# Patient Record
Sex: FEMALE | Race: ASIAN | NOT HISPANIC OR LATINO | Employment: UNEMPLOYED
[De-identification: names, ages, dates, MRNs, and addresses within clinical notes are randomized per-mention and may not be internally consistent; named-entity substitution may affect disease eponyms.]

---

## 2019-08-27 ENCOUNTER — RECORDS - HEALTHEAST (OUTPATIENT)
Dept: ADMINISTRATIVE | Facility: OTHER | Age: 24
End: 2019-08-27

## 2020-02-27 ENCOUNTER — RECORDS - HEALTHEAST (OUTPATIENT)
Dept: ADMINISTRATIVE | Facility: OTHER | Age: 25
End: 2020-02-27

## 2020-02-27 ENCOUNTER — AMBULATORY - HEALTHEAST (OUTPATIENT)
Dept: SURGERY | Facility: CLINIC | Age: 25
End: 2020-02-27

## 2020-02-27 DIAGNOSIS — R22.9 MASS OF SKIN: ICD-10-CM

## 2020-03-13 ENCOUNTER — OFFICE VISIT - HEALTHEAST (OUTPATIENT)
Dept: SURGERY | Facility: CLINIC | Age: 25
End: 2020-03-13

## 2020-03-13 DIAGNOSIS — R22.2 MASS OF TORSO: ICD-10-CM

## 2020-03-16 ENCOUNTER — COMMUNICATION - HEALTHEAST (OUTPATIENT)
Dept: OTOLARYNGOLOGY | Facility: CLINIC | Age: 25
End: 2020-03-16

## 2020-06-18 ENCOUNTER — COMMUNICATION - HEALTHEAST (OUTPATIENT)
Dept: SURGERY | Facility: CLINIC | Age: 25
End: 2020-06-18

## 2020-06-22 ENCOUNTER — COMMUNICATION - HEALTHEAST (OUTPATIENT)
Dept: SURGERY | Facility: CLINIC | Age: 25
End: 2020-06-22

## 2020-06-22 ENCOUNTER — AMBULATORY - HEALTHEAST (OUTPATIENT)
Dept: SURGERY | Facility: CLINIC | Age: 25
End: 2020-06-22

## 2020-06-22 DIAGNOSIS — Z11.59 ENCOUNTER FOR SCREENING FOR OTHER VIRAL DISEASES: ICD-10-CM

## 2020-06-23 ENCOUNTER — COMMUNICATION - HEALTHEAST (OUTPATIENT)
Dept: SURGERY | Facility: CLINIC | Age: 25
End: 2020-06-23

## 2020-06-24 ENCOUNTER — AMBULATORY - HEALTHEAST (OUTPATIENT)
Dept: FAMILY MEDICINE | Facility: CLINIC | Age: 25
End: 2020-06-24

## 2020-06-24 ENCOUNTER — RECORDS - HEALTHEAST (OUTPATIENT)
Dept: ADMINISTRATIVE | Facility: OTHER | Age: 25
End: 2020-06-24

## 2020-06-24 DIAGNOSIS — Z11.59 ENCOUNTER FOR SCREENING FOR OTHER VIRAL DISEASES: ICD-10-CM

## 2020-06-24 ASSESSMENT — MIFFLIN-ST. JEOR: SCORE: 1056.6

## 2020-06-25 ENCOUNTER — ANESTHESIA - HEALTHEAST (OUTPATIENT)
Dept: SURGERY | Facility: AMBULATORY SURGERY CENTER | Age: 25
End: 2020-06-25

## 2020-06-26 ENCOUNTER — SURGERY - HEALTHEAST (OUTPATIENT)
Dept: SURGERY | Facility: AMBULATORY SURGERY CENTER | Age: 25
End: 2020-06-26

## 2020-06-26 ASSESSMENT — MIFFLIN-ST. JEOR: SCORE: 1056.6

## 2021-06-04 VITALS
OXYGEN SATURATION: 100 % | WEIGHT: 96.9 LBS | DIASTOLIC BLOOD PRESSURE: 72 MMHG | HEART RATE: 68 BPM | SYSTOLIC BLOOD PRESSURE: 102 MMHG

## 2021-06-04 VITALS — BODY MASS INDEX: 22.5 KG/M2 | HEIGHT: 56 IN | WEIGHT: 100 LBS

## 2021-06-06 NOTE — TELEPHONE ENCOUNTER
Attempted to contact pt to advise that surgery has been canceled at this time but the phone would ring once then busy tone. Tried twice.  Surgery will be r/s once the hold has been lifted.

## 2021-06-09 NOTE — ANESTHESIA PREPROCEDURE EVALUATION
Anesthesia Evaluation      Patient summary reviewed   No history of anesthetic complications     Airway   Mallampati: I  Neck ROM: full   Pulmonary - negative ROS and normal exam                          Cardiovascular - negative ROS and normal exam   Neuro/Psych - negative ROS     Endo/Other - negative ROS      GI/Hepatic/Renal - negative ROS           Dental - normal exam                        Anesthesia Plan  Planned anesthetic: MAC  Versed/fentanyl/propofol  Ketamine PRN  Decadron/zofran    ASA 1   Induction: intravenous   Anesthetic plan and risks discussed with: patient  Anesthesia plan special considerations: antiemetics,   Post-op plan: routine recovery      covid pcr negative 6/24/2020      Results for orders placed or performed during the hospital encounter of 06/26/20   POCT Pregnancy (Beta-hCG, Qual), Urine (Point of Care) on DOS   Result Value Ref Range    POC Preg, Urine Negative Negative    POCT Kit Lot Number [id number]     POCT Kit Expiration Date 2,022-1     Pos Control Valid Control Valid Control    Neg Control Valid Control Valid Control    Dipstick Lot Number      Dipstick Expiration Date      POC Specific Gravity, Urine
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
## 2021-06-09 NOTE — ANESTHESIA POSTPROCEDURE EVALUATION
Patient: Kuldeep Archer Her  Procedure(s):  EXCISION, LESION, TORSO  Anesthesia type: MAC    Patient location: Phase II Recovery  Last vitals:   Vitals Value Taken Time   BP 91/59 6/26/2020 10:52 AM   Temp 36.6  C (97.8  F) 6/26/2020 10:32 AM   Pulse 68 6/26/2020 10:59 AM   Resp 16 6/26/2020 10:32 AM   SpO2 100 % 6/26/2020 10:59 AM   Vitals shown include unvalidated device data.  Post vital signs: stable  Level of consciousness: awake and responds to simple questions  Post-anesthesia pain: pain controlled  Post-anesthesia nausea and vomiting: no  Pulmonary: unassisted, return to baseline  Cardiovascular: stable and blood pressure at baseline  Hydration: adequate  Anesthetic events: no    QCDR Measures:  ASA# 11 - Jennifer-op Cardiac Arrest: ASA11B - Patient did NOT experience unanticipated cardiac arrest  ASA# 12 - Jennifer-op Mortality Rate: ASA12B - Patient did NOT die  ASA# 13 - PACU Re-Intubation Rate: NA - No ETT / LMA used for case  ASA# 10 - Composite Anes Safety: ASA10A - No serious adverse event    Additional Notes:

## 2021-06-09 NOTE — ANESTHESIA CARE TRANSFER NOTE
Last vitals:   Vitals:    06/26/20 1032   BP: 91/55   Pulse: 60   Resp: 16   Temp: 36.6  C (97.8  F)   SpO2: 100%     Patient's level of consciousness is drowsy  Spontaneous respirations: yes  Maintains airway independently: yes  Dentition unchanged: yes  Oropharynx: oropharynx clear of all foreign objects    QCDR Measures:  ASA# 20 - Surgical Safety Checklist: WHO surgical safety checklist completed prior to induction    PQRS# 430 - Adult PONV Prevention: 4558F - Pt received => 2 anti-emetic agents (different classes) preop & intraop  ASA# 8 - Peds PONV Prevention: NA - Not pediatric patient, not GA or 2 or more risk factors NOT present  PQRS# 424 - Jennifer-op Temp Management: 4559F - At least one body temp DOCUMENTED => 35.5C or 95.9F within required timeframe  PQRS# 426 - PACU Transfer Protocol: - Transfer of care checklist used  ASA# 14 - Acute Post-op Pain: ASA14B - Patient did NOT experience pain >= 7 out of 10

## 2021-06-09 NOTE — TELEPHONE ENCOUNTER
Spoke with Enzo. She was hoping for surgery this Friday. Went over the following details with her:    Surgery Date: Friday June 26th     Location: [facility]                 3rd Floor, [facility]                  [address] [place] [id number]     Arrival Time: 7:15AM (unless instructed otherwise by the preop nurse)    Prep:     1. Schedule a preop physical with your primary care doctor. This may be virtual or face-to-face depending on your doctors preference. Call them right away to schedule this.    2. COVID19 testing is required within 72 hours of surgery. A nurse from [facility] will contact you to arrange this. If the test result is positive, your surgery will be canceled.    3. Nothing to eat or drink for 8 hours before surgery unless instructed differently by the preop nurse.    4. No blood thinners including aspirin for one week prior to surgery. Verify this is safe for you with your primary care doctor before stopping.     5. You need an adult to drive you home and stay with you 24 hours after surgery. Because of COVID19 related visitor restrictions, your escort cannot accompany you to the center. A nurse will call when you are ready to be picked up.    6. When you arrive to the hospital, you will be screened for COVID19 symptoms. If you screen positive, your surgery will need to be postponed for your safety.    7. If the community sees a new surge in COVID19 hospital admissions, your procedure may need to be postponed. We will contact you if this happens.    8. We always encourage you to notify your insurance any time you have something scheduled including surgery. The number is usually right on the back of your insurance card.     Latricia Grewal  [facility], General Surgery  Surgery Scheduler  [phone] (Direct Line)  [phone] (Main Line)

## 2021-06-16 PROBLEM — R22.2 MASS OF TORSO: Status: ACTIVE | Noted: 2020-03-13

## 2021-06-20 NOTE — LETTER
Letter by Hardik Pittman MD at      Author: Hardik Pittman MD Service: -- Author Type: --    Filed:  Encounter Date: 3/13/2020 Status: (Other)         Kuldeep Max MD  [address] [place] [id number]                                  March 22, 2020    Patient: Enzo Boyce   MR Number: [id number]   YOB: 1995   Date of Visit: 3/13/2020     Dear Dr. Mansoor MD:    Thank you for referring Enzo Boyce to me for evaluation. Below are the relevant portions of my assessment and plan of care.    If you have questions, please do not hesitate to call me. I look forward to following Enzo along with you.    Sincerely,        Hardik Pittman MD          CC  No Recipients  Hardik Pittman MD  3/22/2020  4:18 PM  Sign when Signing Visit      [facility] Surgery Consult    HPI:    24 y.o. year old female who I have been consulted by Provider, No Primary Care for evaluation of Mass (Mass present on left buttock for several years. )    Has been there form years. Feels like a lipoma but may represent a cyst. Not been infected or ever drained.     Allergies:Patient has no known allergies.    History reviewed. No pertinent past medical history.    History reviewed. No pertinent surgical history.    CURRENT MEDS:  No current outpatient medications on file prior to visit.     No current facility-administered medications on file prior to visit.        History reviewed. No pertinent family history.     reports that she has never smoked. She has never used smokeless tobacco. She reports previous alcohol use.    Review of Systems:  Negative except left buttock mass, enlarging over time and more symptomatic. Otherwise twelve system of review is negative.      OBJECTIVE:  Vitals:    03/13/20 1133   BP: 102/72   Patient Site: Right Arm   Patient Position: Sitting   Cuff Size: Adult Regular   Pulse: 68   SpO2: 100%   Weight: (!) 96 lb 14.4 oz (44 kg)     There is no height or weight on file to calculate  BMI.    EXAM:  GENERAL: This is a well-developed 24 y.o. female who appears her stated age  HEAD: normocephalic  HEENT: Pupils equal and reactive bilaterally  MOUTH: mucus membranes intact  CARDIAC: RRR without murmur  CHEST/LUNG:  Clear to auscultation  ABDOMEN: Soft, nontender, nondistended, no masses  Buttock: left mass 3-4 cm in diameter no erythema  EXTREMITIES: Grossly normal, warm,   NEUROLOGIC: Focally intact, nonfocal  INTEGUMENT: No open lesions or ulcers  VASCULAR: Pulses intact, symmetrical upper and lower extremities.        LABS:  No results found for: WBC, HGB, HCT, MCV, PLT  INR/Prothrombin Time      No results found for: HGBA1C  No results found for: ALT, AST, GGT, ALKPHOS, BILITOT     Assessment/Plan:   1. Mass of torso  Excision under local mac anesthesia in a same day surgery setting  Discussed risks and benefits.   Answered all questions.   Will schedule    - Skin prep - Clip hair as needed; Standing  - Apply 2% Chlorhexidine Gluconate cloth (Jas Cloth) to surgical area.; Standing  - Insert and maintain IV; Standing  - sodium chloride bacteriostatic 0.9 % injection 0.1-0.3 mL  - sodium chloride flush 3 mL (NS)  - NaCl 0.9% IR 0.9 % irrigation solution 1,000 mL (NS)  - Case Request: EXCISION, LESION, TORSO; Standing  - Verify informed consent for procedure; Standing  - Verify informed consent for Anesthesia; Standing  - ceFAZolin 2 g in dextrose 5% 100 mL (ANCEF)  - Case Request: EXCISION, LESION, TORSO      No follow-ups on file.    Hardik Pittman MD  General Surgery [phone]  Vascular Surgery [phone]

## 2021-06-28 NOTE — PROGRESS NOTES
Progress Notes by Hardik Pittman MD at 3/13/2020 11:30 AM     Author: Hardik Pittman MD Service: -- Author Type: Physician    Filed: 3/22/2020  4:18 PM Encounter Date: 3/13/2020 Status: Signed    : Hardik Pittman MD (Physician)           [facility] Surgery Consult    HPI:    24 y.o. year old female who I have been consulted by Provider, No Primary Care for evaluation of Mass (Mass present on left buttock for several years. )    Has been there form years. Feels like a lipoma but may represent a cyst. Not been infected or ever drained.     Allergies:Patient has no known allergies.    History reviewed. No pertinent past medical history.    History reviewed. No pertinent surgical history.    CURRENT MEDS:  No current outpatient medications on file prior to visit.     No current facility-administered medications on file prior to visit.        History reviewed. No pertinent family history.     reports that she has never smoked. She has never used smokeless tobacco. She reports previous alcohol use.    Review of Systems:  Negative except left buttock mass, enlarging over time and more symptomatic. Otherwise twelve system of review is negative.      OBJECTIVE:  Vitals:    03/13/20 1133   BP: 102/72   Patient Site: Right Arm   Patient Position: Sitting   Cuff Size: Adult Regular   Pulse: 68   SpO2: 100%   Weight: (!) 96 lb 14.4 oz (44 kg)     There is no height or weight on file to calculate BMI.    EXAM:  GENERAL: This is a well-developed 24 y.o. female who appears her stated age  HEAD: normocephalic  HEENT: Pupils equal and reactive bilaterally  MOUTH: mucus membranes intact  CARDIAC: RRR without murmur  CHEST/LUNG:  Clear to auscultation  ABDOMEN: Soft, nontender, nondistended, no masses  Buttock: left mass 3-4 cm in diameter no erythema  EXTREMITIES: Grossly normal, warm,   NEUROLOGIC: Focally intact, nonfocal  INTEGUMENT: No open lesions or ulcers  VASCULAR: Pulses intact, symmetrical upper and  lower extremities.        LABS:  No results found for: WBC, HGB, HCT, MCV, PLT  INR/Prothrombin Time      No results found for: HGBA1C  No results found for: ALT, AST, GGT, ALKPHOS, BILITOT     Assessment/Plan:   1. Mass of torso  Excision under local mac anesthesia in a same day surgery setting  Discussed risks and benefits.   Answered all questions.   Will schedule    - Skin prep - Clip hair as needed; Standing  - Apply 2% Chlorhexidine Gluconate cloth (Jas Cloth) to surgical area.; Standing  - Insert and maintain IV; Standing  - sodium chloride bacteriostatic 0.9 % injection 0.1-0.3 mL  - sodium chloride flush 3 mL (NS)  - NaCl 0.9% IR 0.9 % irrigation solution 1,000 mL (NS)  - Case Request: EXCISION, LESION, TORSO; Standing  - Verify informed consent for procedure; Standing  - Verify informed consent for Anesthesia; Standing  - ceFAZolin 2 g in dextrose 5% 100 mL (ANCEF)  - Case Request: EXCISION, LESION, TORSO      No follow-ups on file.    Hardik Pittman MD  General Surgery [phone]  Vascular Surgery [phone]

## 2022-07-15 ENCOUNTER — OFFICE VISIT (OUTPATIENT)
Dept: SURGERY | Facility: CLINIC | Age: 27
End: 2022-07-15
Payer: COMMERCIAL

## 2022-07-15 DIAGNOSIS — L72.9 CYST OF BUTTOCKS: Primary | ICD-10-CM

## 2022-07-15 PROCEDURE — 99213 OFFICE O/P EST LOW 20 MIN: CPT | Performed by: SPECIALIST

## 2022-07-15 RX ORDER — ACETAMINOPHEN 325 MG/1
975 TABLET ORAL ONCE
Status: CANCELLED | OUTPATIENT
Start: 2022-07-15 | End: 2022-07-15

## 2022-07-15 NOTE — LETTER
7/15/2022         RE: Kuldeep Archer   [address] [place] [address]        Dear Colleague,    Thank you for referring your patient, Kuldeep Boyce, to the [facility] [place]. Please see a copy of my visit note below.        [facility] Surgery Consult    HPI:    27 year old year old female who I have been consulted by Lisset Max for evaluation of Benito Cyst (S/p benito cyst removal in 2020 but the cyst came back)  She is here actually to follow up on a resection of a cyst in the past and may have a recurrence. On buttock not a pilonidal. Some discomfort, no drainage fever or chills.    Allergies:Patient has no known allergies.    No past medical history on file.    Past Surgical History:   Procedure Laterality Date     EXCISE LESION TRUNK N/A 6/26/2020    Procedure: EXCISION, LESION, TORSO;  Surgeon: Hardik Pittman MD;  Location: [facility];  Service: General       CURRENT MEDS:  Current Outpatient Medications   Medication     HYDROcodone-acetaminophen 5-325 mg per tablet     No current facility-administered medications for this visit.       No family history on file.     reports that she has never smoked. She has never used smokeless tobacco. She reports previous alcohol use. She reports that she does not use drugs.    Review of Systems:  Negative except buttock mass or cyst. Otherwise twelve system of review is negative.      OBJECTIVE:  There were no vitals filed for this visit.  There is no height or weight on file to calculate BMI.    EXAM:  GENERAL: This is a well-developed 27 year old female who appears her stated age  HEAD: normocephalic  HEENT: Pupils equal and reactive bilaterally  MOUTH: mucus membranes intact  CARDIAC: RRR without murmur  CHEST/LUNG:  Clear to auscultation  ABDOMEN: Soft, nontender, nondistended, no masses,  BUTTOCK: left side at inferior edge of old incision small bump  EXTREMITIES: Grossly normal, warm,   NEUROLOGIC:  Focally intact, nonfocal  INTEGUMENT: No open lesions or ulcers  VASCULAR: Pulses intact, symmetrical upper and lower extremities.        LABS:  None to review    Images: none    Assessment/Plan:   Lump in incision  May be recurrence of cyst.  Discussed we could excise.  She wants to think about it.      No follow-ups on file.    Hardik Pittman MD  General Surgery [phone]  Vascular Surgery [phone]          Again, thank you for allowing me to participate in the care of your patient.        Sincerely,        Hardik Pittman MD

## 2022-08-01 NOTE — PROGRESS NOTES
[facility] Surgery Consult    HPI:    27 year old year old female who I have been consulted by Lisset Max for evaluation of Adia Cyst (S/p adia cyst removal in 2020 but the cyst came back)  She is here actually to follow up on a resection of a cyst in the past and may have a recurrence. On buttock not a pilonidal. Some discomfort, no drainage fever or chills.    Allergies:Patient has no known allergies.    No past medical history on file.    Past Surgical History:   Procedure Laterality Date     EXCISE LESION TRUNK N/A 6/26/2020    Procedure: EXCISION, LESION, TORSO;  Surgeon: Hardik Pittman MD;  Location: [facility];  Service: General       CURRENT MEDS:  Current Outpatient Medications   Medication     HYDROcodone-acetaminophen 5-325 mg per tablet     No current facility-administered medications for this visit.       No family history on file.     reports that she has never smoked. She has never used smokeless tobacco. She reports previous alcohol use. She reports that she does not use drugs.    Review of Systems:  Negative except buttock mass or cyst. Otherwise twelve system of review is negative.      OBJECTIVE:  There were no vitals filed for this visit.  There is no height or weight on file to calculate BMI.    EXAM:  GENERAL: This is a well-developed 27 year old female who appears her stated age  HEAD: normocephalic  HEENT: Pupils equal and reactive bilaterally  MOUTH: mucus membranes intact  CARDIAC: RRR without murmur  CHEST/LUNG:  Clear to auscultation  ABDOMEN: Soft, nontender, nondistended, no masses,  BUTTOCK: left side at inferior edge of old incision small bump  EXTREMITIES: Grossly normal, warm,   NEUROLOGIC: Focally intact, nonfocal  INTEGUMENT: No open lesions or ulcers  VASCULAR: Pulses intact, symmetrical upper and lower extremities.        LABS:  None to review    Images: none    Assessment/Plan:   Lump in incision  May be recurrence of cyst.  Discussed we could  excise.  She wants to think about it.      No follow-ups on file.    Hardik Pittman MD  General Surgery [phone]  Vascular Surgery [phone]

## 2023-01-17 ENCOUNTER — TELEPHONE (OUTPATIENT)
Dept: SURGERY | Facility: CLINIC | Age: 28
End: 2023-01-17
Payer: COMMERCIAL

## 2023-01-17 NOTE — TELEPHONE ENCOUNTER
LM through [facility]  to see where Kuldeep Archer is at with wanting to schedule surgery with Dr. Pittman. If no return call, I will send a letter to the patient as we have tried several attempts to try and schedule.    Detail Level: Detailed Depth Of Biopsy: dermis Was A Bandage Applied: Yes Size Of Lesion In Cm: 0 Biopsy Type: H and E Biopsy Method: Dermablade Anesthesia Type: 2% lidocaine with epinephrine Anesthesia Volume In Cc (Will Not Render If 0): 0.5 Hemostasis: Drysol Wound Care: Petrolatum Dressing: bandage Destruction After The Procedure: No Type Of Destruction Used: Curettage Curettage Text: The wound bed was treated with curettage after the biopsy was performed. Cryotherapy Text: The wound bed was treated with cryotherapy after the biopsy was performed. Electrodesiccation Text: The wound bed was treated with electrodesiccation after the biopsy was performed. Electrodesiccation And Curettage Text: The wound bed was treated with electrodesiccation and curettage after the biopsy was performed. Silver Nitrate Text: The wound bed was treated with silver nitrate after the biopsy was performed. Lab: [id number] Lab Facility: [id number] Consent: Verbal consent was obtained. Post-Care Instructions: I reviewed with the patient in detail post-care instructions. Patient is to keep the biopsy site dry overnight, and then apply bacitracin twice daily until healed. Patient may apply hydrogen peroxide soaks to remove any crusting. Notification Instructions: Patient will be notified of biopsy results. However, patient instructed to call the office if not contacted within 2 weeks. Billing Type: Third-Party Bill Information: Selecting Yes will display possible errors in your note based on the variables you have selected. This validation is only offered as a suggestion for you. PLEASE NOTE THAT THE VALIDATION TEXT WILL BE REMOVED WHEN YOU FINALIZE YOUR NOTE. IF YOU WANT TO FAX A PRELIMINARY NOTE YOU WILL NEED TO TOGGLE THIS TO 'NO' IF YOU DO NOT WANT IT IN YOUR FAXED NOTE.